# Patient Record
Sex: FEMALE
[De-identification: names, ages, dates, MRNs, and addresses within clinical notes are randomized per-mention and may not be internally consistent; named-entity substitution may affect disease eponyms.]

---

## 2023-04-07 ENCOUNTER — NURSE TRIAGE (OUTPATIENT)
Dept: OTHER | Facility: CLINIC | Age: 33
End: 2023-04-07

## 2023-04-07 NOTE — TELEPHONE ENCOUNTER
Location of patient: [place]    Current Symptoms: Pt reports a productive cough, shortness of breath both at rest and with activity, vomiting (3 episodes within the last 24 hours), headache and body aches. Her symptoms started 2 days ago. She has a h/o asthma. She is also 2 months pregnant. She tested negative for Covid. She believes that she has the flu. Associated Symptoms: NA    Pain Severity: Headache, 8 out of 10    Temperature: She has not checked her temperature. She does c/o body aches. What has been tried: Ar[facility]unsym    Recommended disposition: Go to ED Now    Care advice provided, patient verbalizes understanding; denies any other questions or concerns.     Outcome: Patient/caller agrees to proceed to [facility] Emergency Department    This triage is a result of a call to the [address] [place] [address]     Reason for Disposition   MODERATE difficulty breathing (e.g., speaks in phrases, SOB even at rest, pulse 100-120) of new-onset or worse than normal    Protocols used: Breathing Difficulty-ADULT-[place]